# Patient Record
Sex: FEMALE | Race: WHITE
[De-identification: names, ages, dates, MRNs, and addresses within clinical notes are randomized per-mention and may not be internally consistent; named-entity substitution may affect disease eponyms.]

---

## 2019-04-10 ENCOUNTER — HOSPITAL ENCOUNTER (EMERGENCY)
Dept: HOSPITAL 43 - DL.ED | Age: 64
Discharge: HOME | End: 2019-04-10
Payer: COMMERCIAL

## 2019-04-10 VITALS — SYSTOLIC BLOOD PRESSURE: 161 MMHG | DIASTOLIC BLOOD PRESSURE: 68 MMHG

## 2019-04-10 DIAGNOSIS — Z88.1: ICD-10-CM

## 2019-04-10 DIAGNOSIS — R10.13: Primary | ICD-10-CM

## 2019-04-10 DIAGNOSIS — Z79.899: ICD-10-CM

## 2019-04-10 DIAGNOSIS — N18.9: ICD-10-CM

## 2019-04-10 PROCEDURE — 99285 EMERGENCY DEPT VISIT HI MDM: CPT

## 2019-04-10 PROCEDURE — 81001 URINALYSIS AUTO W/SCOPE: CPT

## 2019-04-10 PROCEDURE — 85025 COMPLETE CBC W/AUTO DIFF WBC: CPT

## 2019-04-10 PROCEDURE — 96374 THER/PROPH/DIAG INJ IV PUSH: CPT

## 2019-04-10 PROCEDURE — 80053 COMPREHEN METABOLIC PANEL: CPT

## 2019-04-10 PROCEDURE — 71046 X-RAY EXAM CHEST 2 VIEWS: CPT

## 2019-04-10 PROCEDURE — 36415 COLL VENOUS BLD VENIPUNCTURE: CPT

## 2019-04-10 PROCEDURE — 84484 ASSAY OF TROPONIN QUANT: CPT

## 2019-04-10 PROCEDURE — 93005 ELECTROCARDIOGRAM TRACING: CPT

## 2019-04-10 NOTE — CR
Clinical history: 63-year-old female chest pain.

 

Interpretation: PA lateral chest films confirm normal cardiac size and

configuration. Left-sided aortic arch. 

No cephalization of vascular flow, signs of alveolar edema or dependent pleural

fluid accumulation.

 

No lung mass, hilar lymphadenopathy or focal lobar pneumonia.

 

Mild scoliosis. 

 

Evidence of chronic disc disease at thoracolumbar juncture i.e. interspace

narrowing, endplate sclerosis, marginal spondylosis. 

 

No atelectasis/collapse.

 

No pneumothorax.

 

CONCLUSION: No acute new cardiopulmonary abnormality since comparison film of 26 June 2010.

## 2019-04-10 NOTE — EDM.PDOC
ED HPI GENERAL MEDICAL PROBLEM





- General


Chief Complaint: Chest Pain


Stated Complaint: CHEST PAIN


Time Seen by Provider: 04/10/19 10:05


Source of Information: Reports: Patient


History Limitations: Reports: No Limitations





- History of Present Illness


INITIAL COMMENTS - FREE TEXT/NARRATIVE: 





She comes emergency department today with complaints of chest pain. She points 

to the epigastric region when she is referring to her chest pain. Approximately 

45 minutes prior to arrival the patient developed sharp shooting stabbing chest 

pain in the epigastric region. It did radiate up into her neck. Showed no 

shortness of breath. No diaphoresis. No weakness dizziness lightheadedness. No 

palpitations. She denies any recent fever chills or body aches. No abdominal 

pain nausea or vomiting. No hematuria dysuria or urinary frequency. No flank 

pain. The pain does not radiate into her back. She does not smoke. She has no 

history of cancer. No recent long extensive travel. 


  ** Chest


Pain Score (Numeric/FACES): 5





- Related Data


 Allergies











Allergy/AdvReac Type Severity Reaction Status Date / Time


 


levofloxacin [From Levaquin] Allergy  Shortness Verified 04/10/19 10:29





   of Breath  











Home Meds: 


 Home Meds





Lisinopril [Prinivil] 20 mg PO DAILY 12/04/14 [History]


Simvastatin 20 mg PO DAILY 12/04/14 [History]


Hydrochlorothiazide 25 mg PO DAILY 05/17/15 [History]


hydrOXYzine Pamoate [Hydroxyzine Pamoate] 25 mg PO DAILY 05/17/15 [History]


ALPRAZolam [Alprazolam] 0.5 tab PO BEDTIME PRN 10/29/15 [History]


Calcium Carbonate [Calcium] 500 mg PO DAILY 10/29/15 [History]


Multivitamin with Minerals [Multivitamins with Minerals] 1 each PO DAILY 10/29/

15 [History]


Cyclobenzaprine [Flexeril] 5 mg PO PRN 04/10/19 [History]


DULoxetine HCl [Cymbalta] 60 mg PO DAILY 04/10/19 [History]


Potassium Chloride 20 meq PO DAILY 04/10/19 [History]











Past Medical History


Other HEENT History: wears glasses


Other Genitourinary History: chronic kidney disease (CKD) cystostomy 1998


Other Musculoskeletal History: torn ACL





- Past Surgical History


Other HEENT Surgeries/Procedures: right upper cadavar bone


Other GI Surgeries/Procedures: inguina lhernia 1977


Other Musculoskeletal Surgeries/Procedures:: feet surgeries





ED ROS GENERAL





- Review of Systems


Review Of Systems: ROS reveals no pertinent complaints other than HPI.





ED EXAM, GENERAL





- Physical Exam


Exam: See Below


Exam Limited By: No Limitations


General Appearance: Alert, WD/WN, Anxious


Eye Exam: Bilateral Eye: Normal Inspection


Ears: Normal External Exam


Nose: Normal Inspection, Normal Mucosa


Throat/Mouth: Normal Inspection


Head: Atraumatic, Normocephalic


Neck: Normal Inspection, Supple


Respiratory/Chest: No Respiratory Distress, Lungs Clear, Normal Breath Sounds, 

No Accessory Muscle Use, Chest Non-Tender


Cardiovascular: Normal Peripheral Pulses, Regular Rate, Rhythm, No JVD


Peripheral Pulses: 2+: Radial (L), Radial (R), Posterior Tibial (L), Posterior 

Tibial (R), Dorsalis Pedis (L), Dorsalis Pedis (R)


GI/Abdominal: Normal Bowel Sounds, Soft, Tender (Tenderness to the epigastric 

region without rebound guarding or distention. Negative Warren sign.)


Back Exam: Normal Inspection


Extremities: Normal Inspection, Normal Range of Motion, Normal Capillary Refill


Neurological: Alert, Oriented, Normal Cognition, No Motor/Sensory Deficits


Psychiatric: Normal Affect, Normal Mood


Skin Exam: Warm, Dry, Intact, Normal Color, No Rash





EKG INTERPRETATION


EKG Date: 04/10/19


Time: 10:01


Rhythm: NSR


Rate (Beats/Min): 71


Axis: Normal


P-Wave: Present


QRS: Normal


ST-T: Normal


QT: Normal


Comparison: NA - No Prior EKG





Course





- Vital Signs


Last Recorded V/S: 


 Last Vital Signs











Temp  37.8 C   04/10/19 10:13


 


Pulse  72   04/10/19 10:13


 


Resp  16   04/10/19 10:13


 


BP  161/68 H  04/10/19 10:13


 


Pulse Ox  96   04/10/19 10:13














- Orders/Labs/Meds


Orders: 


 Active Orders 24 hr











 Category Date Time Status


 


 EKG 12 Lead [EKG Documentation Completion] [RC] URGENT Care  04/10/19 10:15 

Active


 


 Peripheral IV Care [RC] .AS DIRECTED Care  04/10/19 10:15 Active


 


 Sodium Chloride 0.9% [Saline Flush] Med  04/10/19 10:14 Active





 10 ml FLUSH ASDIRECTED PRN   


 


 Peripheral IV Insertion Adult [OM.PC] Stat Oth  04/10/19 10:15 Ordered








 Medication Orders





Sodium Chloride (Saline Flush)  10 ml FLUSH ASDIRECTED PRN


   PRN Reason: Keep Vein Open


   Last Admin: 04/10/19 10:30  Dose: 10 ml








Labs: 


 Laboratory Tests











  04/10/19 04/10/19 04/10/19 Range/Units





  10:09 10:09 10:33 


 


WBC  4.5 L    (5.0-10.0)  10^3/uL


 


RBC  4.43    (4.2-5.4)  10^6/uL


 


Hgb  13.7    (12.0-16.0)  g/dL


 


Hct  40.3    (37.0-47.0)  %


 


MCV  91.0    ()  fL


 


MCH  30.9    (27.0-34.0)  pg


 


MCHC  34.0    (33.0-35.0)  g/dL


 


Plt Count  266    (150-450)  10^3/uL


 


Neut % (Auto)  53.6    (42.2-75.2)  %


 


Lymph % (Auto)  35.0    (20.5-50.1)  %


 


Mono % (Auto)  8.4 H    (2-8)  %


 


Eos % (Auto)  2.6    (1.0-3.0)  %


 


Baso % (Auto)  0.4    (0.0-1.0)  %


 


Sodium      (135-145)  mmol/L


 


Potassium   Not Reportable   


 


Chloride   Not Reportable   


 


Carbon Dioxide   Not Reportable   


 


Anion Gap   Not Reportable   


 


BUN   Not Reportable   


 


Creatinine   Not Reportable   


 


Est Cr Clr Drug Dosing   Not Reportable   


 


Estimated GFR (MDRD)   Not Reportable   


 


BUN/Creatinine Ratio   Not Reportable   


 


Glucose   Not Reportable   


 


Calcium   Not Reportable   


 


Total Bilirubin   Not Reportable   


 


AST   Not Reportable   


 


ALT   Not Reportable   


 


Alkaline Phosphatase   Not Reportable   


 


Troponin I   < 0.02   (0.00-0.02)  ng/ml


 


Total Protein   Not Reportable   


 


Albumin   Not Reportable   


 


Globulin   Not Reportable   


 


Albumin/Globulin Ratio   Not Reportable   


 


Urine Color    Yellow  (YELLOW)  


 


Urine Appearance    Clear  (CLEAR)  


 


Urine pH    8.5  (5.0-9.0)  


 


Ur Specific Gravity    1.020  (1.005-1.030)  


 


Urine Protein    100 H  (NEGATIVE)  


 


Urine Glucose (UA)    Negative  (NEGATIVE)  


 


Urine Ketones    Negative  (NEGATIVE)  


 


Urine Occult Blood    Trace-intact H  (NEGATIVE)  


 


Urine Nitrite    Negative  (NEGATIVE)  


 


Urine Bilirubin    Negative  (NEGATIVE)  


 


Urine Urobilinogen    0.2  (0.2-1.0)  mg/dL


 


Ur Leukocyte Esterase    Negative  (NEGATIVE)  


 


Urine RBC    20-30 H  /HPF


 


Urine WBC    Not seen  (0-5/HPF)  /HPF


 


Ur Epithelial Cells    Rare  /HPF


 


Amorphous Sediment    Few  (0/HPF)  /HPF


 


Urine Bacteria    Few  (0-FEW/HPF)  /HPF


 


Hyaline Casts    Few H  /LPF


 


Urine Mucus    Few H  /LPF














  04/10/19 Range/Units





  13:35 


 


WBC   (5.0-10.0)  10^3/uL


 


RBC   (4.2-5.4)  10^6/uL


 


Hgb   (12.0-16.0)  g/dL


 


Hct   (37.0-47.0)  %


 


MCV   ()  fL


 


MCH   (27.0-34.0)  pg


 


MCHC   (33.0-35.0)  g/dL


 


Plt Count   (150-450)  10^3/uL


 


Neut % (Auto)   (42.2-75.2)  %


 


Lymph % (Auto)   (20.5-50.1)  %


 


Mono % (Auto)   (2-8)  %


 


Eos % (Auto)   (1.0-3.0)  %


 


Baso % (Auto)   (0.0-1.0)  %


 


Sodium   (135-145)  mmol/L


 


Potassium   


 


Chloride   


 


Carbon Dioxide   


 


Anion Gap   


 


BUN   


 


Creatinine   


 


Est Cr Clr Drug Dosing   


 


Estimated GFR (MDRD)   


 


BUN/Creatinine Ratio   


 


Glucose   


 


Calcium   


 


Total Bilirubin   


 


AST   


 


ALT   


 


Alkaline Phosphatase   


 


Troponin I  < 0.02  (0.00-0.02)  ng/ml


 


Total Protein   


 


Albumin   


 


Globulin   


 


Albumin/Globulin Ratio   


 


Urine Color   (YELLOW)  


 


Urine Appearance   (CLEAR)  


 


Urine pH   (5.0-9.0)  


 


Ur Specific Gravity   (1.005-1.030)  


 


Urine Protein   (NEGATIVE)  


 


Urine Glucose (UA)   (NEGATIVE)  


 


Urine Ketones   (NEGATIVE)  


 


Urine Occult Blood   (NEGATIVE)  


 


Urine Nitrite   (NEGATIVE)  


 


Urine Bilirubin   (NEGATIVE)  


 


Urine Urobilinogen   (0.2-1.0)  mg/dL


 


Ur Leukocyte Esterase   (NEGATIVE)  


 


Urine RBC   /HPF


 


Urine WBC   (0-5/HPF)  /HPF


 


Ur Epithelial Cells   /HPF


 


Amorphous Sediment   (0/HPF)  /HPF


 


Urine Bacteria   (0-FEW/HPF)  /HPF


 


Hyaline Casts   /LPF


 


Urine Mucus   /LPF











Meds: 


Medications











Generic Name Dose Route Start Last Admin





  Trade Name Freq  PRN Reason Stop Dose Admin


 


Sodium Chloride  10 ml  04/10/19 10:14  04/10/19 10:30





  Saline Flush  FLUSH   10 ml





  ASDIRECTED PRN   Administration





  Keep Vein Open   





     





     





     














Discontinued Medications














Generic Name Dose Route Start Last Admin





  Trade Name Tre  PRN Reason Stop Dose Admin


 


Al Hydroxide/Mg Hydroxide  30 ml  04/10/19 10:15  04/10/19 10:29





  Gi Cocktail  PO  04/10/19 10:16  30 ml





  ONETIME ONE   Administration





     





     





     





     


 


Aspirin  324 mg  04/10/19 10:15  04/10/19 10:29





  Aspirin  PO  04/10/19 10:16  324 mg





  ONETIME ONE   Administration





     





     





     





     


 


Famotidine  20 mg  04/10/19 11:53  04/10/19 11:59





  Pepcid  IVPUSH  04/10/19 11:54  20 mg





  ONETIME ONE   Administration





     





     





     





     














- Re-Assessments/Exams


Free Text/Narrative Re-Assessment/Exam: 





04/10/19 10:21


GI Cocktail


Aspirin PO. 


04/10/19 10:56


At this time her subjective pain is completely resolved. Her troponin is 

normal. She feels much better and back to normal following the GI cocktail. 

Reexamination of the abdomen still shows some mild tenderness in the epigastric 

region. Negative Warren sign the rest of the abdomen is negative and nontender. 

Still waiting on lab results.


04/10/19 12:26


Our chemistry analyzer is down for the day. BUNs 16, and a 142, K3.9, , 

CO2 34, glucose 94, creatinine 1.1, CA 9.5, anion gap 7, albumin 4, alkaline 

phosphatase 59, AST 27 AST 28, total bilirubin 0.4, protein 7.6, GFR 50. She 

still has some generalized tenderness in the epigastric region. We will repeat 

a troponin at 4 hours and she presented with chest pain shortly after the 

development of it and a normal troponin initially. She was given 20 mg of 

famotidine IV. 


04/10/19 14:18


Repeat troponin negative. 


she has had a PUD in the past and has been off omeprazole for quite some time. 

I wonder if this is not a return of the issue. Other considerations in the 

future would be a gallbladder or EGD evaluation although not indicated at this 

time. We will send home with Carafate and omeprazole and have recheck. The 

patient is comfortable with this plan and her questions answered. 





Departure





- Departure


Time of Disposition: 14:16


Disposition: Home, Self-Care 01


Clinical Impression: 


 Epigastric pain





Instructions:  Abdominal Pain, Adult, Easy-to-Read


Forms:  ED Department Discharge


Additional Instructions: 


Carafate, 1 tablet 4 times a day. 1/2 hour before meals and bedtime. RX #120.


Omeprazole, 1 tablet daily for the next 28 days. RX given. 


No spicy or rich foods. 


Return to the ED if new or worsening symptoms


Follow up with PCP in 1 week if not improving sooner if worse and consider 

further testing such as gallbladder. 





- My Orders


Last 24 Hours: 


My Active Orders





04/10/19 10:14


Sodium Chloride 0.9% [Saline Flush]   10 ml FLUSH ASDIRECTED PRN 





04/10/19 10:15


EKG 12 Lead [EKG Documentation Completion] [RC] URGENT 


Peripheral IV Care [RC] .AS DIRECTED 


Peripheral IV Insertion Adult [OM.PC] Stat 














- Assessment/Plan


Last 24 Hours: 


My Active Orders





04/10/19 10:14


Sodium Chloride 0.9% [Saline Flush]   10 ml FLUSH ASDIRECTED PRN 





04/10/19 10:15


EKG 12 Lead [EKG Documentation Completion] [RC] URGENT 


Peripheral IV Care [RC] .AS DIRECTED 


Peripheral IV Insertion Adult [OM.PC] Stat 











Assessment:: 





Epigastric pain, ?GERD


None cardiac chest pain reported. 


Plan: 





Carafate, 1 tablet 4 times a day. 1/2 hour before meals and bedtime. RX #120.


Omeprazole, 1 tablet daily for the next 28 days. RX given. 


No spicy or rich foods. 


Return to the ED if new or worsening symptoms


Follow up with PCP in 1 week if not improving sooner if worse and consider 

further testing such as gallbladder.

## 2019-06-05 ENCOUNTER — HOSPITAL ENCOUNTER (OUTPATIENT)
Dept: HOSPITAL 43 - DL.ENDO | Age: 64
Discharge: HOME | End: 2019-06-05
Attending: INTERNAL MEDICINE
Payer: COMMERCIAL

## 2019-06-05 VITALS — SYSTOLIC BLOOD PRESSURE: 134 MMHG | DIASTOLIC BLOOD PRESSURE: 65 MMHG

## 2019-06-05 VITALS — HEART RATE: 58 BPM

## 2019-06-05 DIAGNOSIS — I12.9: ICD-10-CM

## 2019-06-05 DIAGNOSIS — Z88.1: ICD-10-CM

## 2019-06-05 DIAGNOSIS — Z87.11: ICD-10-CM

## 2019-06-05 DIAGNOSIS — F32.9: ICD-10-CM

## 2019-06-05 DIAGNOSIS — Z80.0: ICD-10-CM

## 2019-06-05 DIAGNOSIS — E78.5: ICD-10-CM

## 2019-06-05 DIAGNOSIS — N18.9: ICD-10-CM

## 2019-06-05 DIAGNOSIS — F41.1: ICD-10-CM

## 2019-06-05 DIAGNOSIS — K29.50: Primary | ICD-10-CM

## 2019-06-05 PROCEDURE — 43239 EGD BIOPSY SINGLE/MULTIPLE: CPT

## 2019-06-05 PROCEDURE — 87077 CULTURE AEROBIC IDENTIFY: CPT

## 2019-06-05 NOTE — OR
DATE:  06/05/2019

 

PROCEDURE PERFORMED:  Esophagogastroduodenoscopy, narrowband imaging,

magnification views, and biopsies.

 

INSTRUMENT USED:  GIF- Olympus video panendoscope.

 

PREMEDICATIONS:  No oral or topical anesthesia used.  Fentanyl 100 mcg

intravenous, Versed 2 mg intravenous.  Nasal O2 cannula.

 

The procedure was done under pulse oximetry, BP recording, and cardiac monitor.

 

INDICATION:  The patient with persistent dyspepsia, upper abdominal pain, and

heartburn, unexplained and not responsive to medical measures.  Positive family

history for gastric cancer.  Esophagogastroduodenoscopy is performed for

detection of any active erosive lesions, Roche esophagus and/or malignancy

also under consideration, H. pylori status to be determined, endoscopic

hemostasis therapy if needed.

 

DESCRIPTION OF PROCEDURE:  The scope was passed with ease.  Adequate

visualization of the esophagus was made from proximal to distal areas.  No upper

esophageal lesions identified.  No distal esophageal stricture.  No uphill or

downhill esophageal varices.  No Shilpa-Harding tear.  No evidence of erosive

esophagitis by Union criteria.  No esophageal polyp or tumor mass

identified.  Z-line was seen at around 40 cm distal to the oral verge,

configuration consistent with grade 1 by ZAP classification.  No proximal

gastric varices noted.  Gastric fundus examination by retroflexion showed no

polypoid lesions.  No gastric ulcer, malignant mass, or vascular ectasia

identified.  Duodenal bulb showed no ulcer.  Visualized second part of the

duodenum was unremarkable.  Multiple pinch biopsies were taken from the gastric

antrum and proximal body and sent for [employer] test for H. pylori and

histopathology.  In the proximal gastric body, diminutive benign-appearing

submucosal polypoid area was noted.  NBI including magnification views was

obtained.  Photographs were taken.  Numerous pinch biopsies were taken and sent

for histopathology.  No bleeding was noted from any of the visualized areas at

the completion of examination.  Photographs were taken of the duodenal bulb,

gastric antrum, fundus, and distal esophagus.

 

IMPRESSION:  Diminutive gastric body submucosal lesion.

 

The patient tolerated the procedure well.

 

DD:  06/05/2019 06:55:10

DT:  06/05/2019 12:50:42

[facility]

Job #:  [id number]